# Patient Record
Sex: FEMALE | Race: WHITE | NOT HISPANIC OR LATINO | URBAN - METROPOLITAN AREA
[De-identification: names, ages, dates, MRNs, and addresses within clinical notes are randomized per-mention and may not be internally consistent; named-entity substitution may affect disease eponyms.]

---

## 2022-07-13 ENCOUNTER — OFFICE VISIT (OUTPATIENT)
Dept: URBAN - METROPOLITAN AREA CLINIC 96 | Facility: CLINIC | Age: 57
End: 2022-07-13
Payer: COMMERCIAL

## 2022-07-13 ENCOUNTER — DASHBOARD ENCOUNTERS (OUTPATIENT)
Age: 57
End: 2022-07-13

## 2022-07-13 VITALS
SYSTOLIC BLOOD PRESSURE: 122 MMHG | BODY MASS INDEX: 22.47 KG/M2 | HEART RATE: 65 BPM | HEIGHT: 61 IN | TEMPERATURE: 98.4 F | DIASTOLIC BLOOD PRESSURE: 77 MMHG | WEIGHT: 119 LBS

## 2022-07-13 DIAGNOSIS — K57.90 DIVERTICULOSIS: ICD-10-CM

## 2022-07-13 DIAGNOSIS — Z80.0 FAMILY HISTORY OF COLON CANCER REQUIRING SCREENING COLONOSCOPY: ICD-10-CM

## 2022-07-13 DIAGNOSIS — Z85.810 PERSONAL HISTORY OF MALIGNANT NEOPLASM OF TONGUE: ICD-10-CM

## 2022-07-13 PROBLEM — 397881000: Status: ACTIVE | Noted: 2022-07-12

## 2022-07-13 PROCEDURE — 99203 OFFICE O/P NEW LOW 30 MIN: CPT | Performed by: INTERNAL MEDICINE

## 2022-07-13 RX ORDER — POLYETHYLENE GLYOCOL 3350, SODIUM CHLORIDE, SODIUM BICARBONATE AND POTASSIUM CHLORIDE 420; 11.2; 5.72; 1.48 G/4L; G/4L; G/4L; G/4L
AS DIRECTED POWDER, FOR SOLUTION NASOGASTRIC; ORAL ONCE
Qty: 1 | Refills: 0 | OUTPATIENT
Start: 2022-07-13 | End: 2022-07-14

## 2022-07-13 RX ORDER — CHOLECALCIFEROL TAB 50 MCG (2000 UNIT) 50 MCG
ONCE PER WEEK TAB ORAL
Status: ACTIVE | COMMUNITY

## 2022-07-13 RX ORDER — PSYLLIUM HUSK 0.4 G
2 CAPSULES WITH 8 OUNCES OF LIQUID CAPSULE ORAL THREE TIMES A DAY
Status: ACTIVE | COMMUNITY

## 2022-07-13 RX ORDER — ESTRADIOL 0.1 MG/D
FILM, EXTENDED RELEASE TRANSDERMAL
Qty: 0 | Refills: 0 | Status: ACTIVE | COMMUNITY
Start: 1900-01-01

## 2022-07-13 NOTE — PHYSICAL EXAM GASTROINTESTINAL
Abdomen , scars, soft, nontender, nondistended , no guarding or rigidity , no masses palpable , normal bowel sounds , Liver and Spleen,  no hepatosplenomegaly , liver nontender

## 2022-07-13 NOTE — HPI-TODAY'S VISIT:
57-year-old female remotely seen by Dr. Noyola 1/16/2019 for follow-up of diverticulitis noted on CT imaging at [facility].  Given that patient had recent colonoscopy in 2017 with negative PET scan, Dr. Noyola did not pursue repeat colonoscopy.  Patient was diagnosed with tongue cancer in 2016 after presenting with lymphadenopathy.  Status post prior radiation therapy, PEG tube.  Prior colonoscopy performed by myself 5/15/2017 with few sigmoid diverticuli, hypertrophied anal papilla, otherwise unremarkable to the cecum.  Repeat colonoscopy was recommended in 5 years given family history of colon cancer in father age 50.  Patient presents for screening colonoscopy. Denies any symptoms of concern. No changes in bowel habits, no rectal bleeding, no abdominal pain, no unintentional weight loss. No family history of colon polyps or colon cancer, no history of anesthesia problems. Up to date with primary MD.

## 2022-07-19 PROBLEM — 429409005: Status: ACTIVE | Noted: 2022-07-12

## 2022-09-08 ENCOUNTER — OFFICE VISIT (OUTPATIENT)
Dept: URBAN - METROPOLITAN AREA SURGERY CENTER 18 | Facility: SURGERY CENTER | Age: 57
End: 2022-09-08
Payer: COMMERCIAL

## 2022-09-08 DIAGNOSIS — Z80.0 BROTHER AT YOUNG AGE FAMILY HISTORY OF COLON CANCER: ICD-10-CM

## 2022-09-08 DIAGNOSIS — Z12.11 COLON CANCER SCREENING: ICD-10-CM

## 2022-09-08 DIAGNOSIS — K63.5 BENIGN COLON POLYP: ICD-10-CM

## 2022-09-08 PROCEDURE — G8907 PT DOC NO EVENTS ON DISCHARG: HCPCS | Performed by: INTERNAL MEDICINE

## 2022-09-08 PROCEDURE — 45380 COLONOSCOPY AND BIOPSY: CPT | Performed by: INTERNAL MEDICINE

## 2024-07-31 ENCOUNTER — TELEPHONE ENCOUNTER (OUTPATIENT)
Dept: URBAN - METROPOLITAN AREA CLINIC 96 | Facility: CLINIC | Age: 59
End: 2024-07-31

## 2024-08-21 ENCOUNTER — OFFICE VISIT (OUTPATIENT)
Dept: URBAN - METROPOLITAN AREA CLINIC 96 | Facility: CLINIC | Age: 59
End: 2024-08-21